# Patient Record
Sex: FEMALE | Race: ASIAN | NOT HISPANIC OR LATINO
[De-identification: names, ages, dates, MRNs, and addresses within clinical notes are randomized per-mention and may not be internally consistent; named-entity substitution may affect disease eponyms.]

---

## 2022-02-16 PROBLEM — Z00.00 ENCOUNTER FOR PREVENTIVE HEALTH EXAMINATION: Status: ACTIVE | Noted: 2022-02-16

## 2022-03-14 ENCOUNTER — APPOINTMENT (OUTPATIENT)
Dept: HEMATOLOGY ONCOLOGY | Facility: CLINIC | Age: 72
End: 2022-03-14

## 2022-03-15 NOTE — DISCUSSION/SUMMARY
[FreeTextEntry1] : The visit was provided via telehealth using real-time 2-way audio visual technology. The patient, Alyssia Mcintosh, was located at home in [place], [place] at the time of the visit. The provider, Jomar Shaw, was located in [place], [place] at the time of the visit. The patient, Alyssia Mcintosh and Provider participated in the telehealth encounter. Her daughter-in-law also participated. Consent for telehealth services was given on 2022 by the patient, Alyssia Mcintosh. \par \par REASON FOR CONSULT\par Alyssia Mcintosh is a 71-year-old female who was referred by Dr. Tamie Hernandez for cancer genetic counseling and risk assessment due to a recent diagnosis of pancreatic cancer. She was accompanied by her daughter-in-law, Laura. \par \par RELEVANT MEDICAL HISTORY\par Ms. Mcintosh was diagnosed with pancreatic cancer in 2021 at age 70. 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 \par \par OTHER MEDICAL AND SURGICAL HISTORY:\par Stent. \par \par PAST OB/GYN HISTORY:\par Obstetrical History: \par Age at Menarche: 19\par Menopausal with LMP at age 46\par Age at First Live Birth: 23\par Oral Contraceptive Use: No\par Hormone Replacement Therapy: No\par \par CANCER SCREENING HISTORY:  \par Breast: Last mammogram 2 years ago, normal. Frequency: every 2 years. \par GYN: None\par Colon: None\par Skin: None. No concerns reported today.  \par \par SOCIAL HISTORY:\par •	Tobacco-product use: No\par \par FAMILY HISTORY:\par Maternal and paternal ancestry was reported as [place] Ashkenazi Hinduism heritage reported. A detailed family history of cancer was ascertained, see below and scanned chart for pedigree. \par \par To Ms. Mcintosh’s knowledge, no one in the family has had germline testing for cancer susceptibility.  \par 	\par 	RISK ASSESSMENT:\par Ms. 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 We recommended genetic testing for a pancreatic cancer panel. This test analyzes 20 genes: APC, OMERO, BMPR1A, BRCA1, BRCA2, CDKN2A, EPCAM, MEN1, MLH1, MSH2, MSH6, NF1, PALB2, PMS2, SMAD4, STK11, TP53, TSC1, TSC2, VHL.\par \par We discussed the risks, benefits and limitations, and implications of genetic testing. We also discussed the psychosocial implications of genetic testing. Possible test results were reviewed with Ms. Mcintosh, along with associated medical management options. The Genetic Information Non-discrimination Act (CHANDA) was also reviewed.\par \par Ms. Mcintosh verbally consented to the above mentioned genetic testing panel. [facility] informed consent form was emailed to the patient. A saliva sample kit was mailed to the patient today. Once the sample has been collected and sent out, Ms. Mcintosh will inform us. \par \par PLAN:\par \par 1.	Saliva kit was sent to Ms. Mcintosh’s home for collection. Once collected and dropped off, patient will inform us. \par 2.	Ms. Mcintosh was sent a copy of the [facility] consent form via email to be filled, signed, and returned to us.\par 3.	We will contact Ms. Mcintosh to schedule a follow-up appointment once the results are available. Results generally return in 2-3 weeks from the day the sample kit is mailed.\par \par \par For any additional questions please call Cancer Genetics at [phone]. \par \par \par Jomar Shaw MS, [facility] [place]par Genetic Counselor, Cancer Genetics\par \par \par CC: \par Dr. Tamie Hernandez

## 2022-10-12 ENCOUNTER — NON-APPOINTMENT (OUTPATIENT)
Age: 72
End: 2022-10-12

## 2022-10-12 NOTE — DISCUSSION/SUMMARY
[FreeTextEntry1] : 10/12/2022\par \par Our GCA, Aleksey, was finally able to speak to patient's daughter-in-law Laura today to discuss the genetic testing that was previously reviewed at her Cancer Genetics visit 03/14/2022. Laura informed us that the patient is no longer interested in pursuing genetic testing. She was made aware if Ms. Mcintosh changes her mind for genetic counseling or testing, she can reach out to our team at [phone]. [facility] was messaged to cancel the order previously placed. \par \par Jomar Shaw, MS, [facility] [place] Genetic Counselor